# Patient Record
(demographics unavailable — no encounter records)

---

## 2020-02-17 NOTE — NUR
Patient triaged and placed in waiting room. VSS and patient appears in no acute 
distress at this time. Accompanied by daughter and grandson, awaiting available 
bed, and MD notified of need for MSE.

## 2020-02-17 NOTE — NUR
Patient will be admitted to care of Dr. Verdugo.  Admitted to  Telemetry unit.  
Will go to room 125 A. Belongings list completed.  Complete and up to date 
summary report printed. SBAR report to be given at bedside with opportunity for 
questions.

## 2020-02-17 NOTE — NUR
# 20 gauge angiocath placed to LFA.  Use of asceptic technique.  Opsite placed 
over site.  Blood return noted.  Flushed with 10 cc of normal saline.  No 
evidence of infiltration noted.  Patient tolerated well.

## 2020-02-17 NOTE — NUR
Medication reconciliation completed with information provided by son. Any prior 
medication reconciliation on file was reviewed and corrected.

## 2020-02-17 NOTE — NUR
Daughter visiting

Son is leaving. Daughter and grandson arrived and they will be translating for patient, he 
speaks Polish.

## 2020-02-17 NOTE — NUR
ADMIT NOTE



Received pt from ER to the floor with a diagnosis of PNEUMONIA.  Admission process 
initiated. Patient oriented to pain management, safety and call light-teach back done.

## 2020-02-17 NOTE — NUR
Patient arrived in the ED c/o GENERALIZED WEAKNESS AND HYPOTENSION that started 
yesterday.  Denied any chest pain or shortness of breath.  Denied any fevers, 
chills, vomiting or nausea.  Patient is alert and oriented x3, respirations 
even and unlabored, speaking in full sentences and ambulating using a walker.  
VSS, pain level 5/10.  Son at bedside.  Informed of the approximate wait time.  
Instructed to notify ED staff for any changes in condition or worsening of 
symptoms while waiting to be seen by a provider.  Patient verbalized 
understanding.

## 2020-02-17 NOTE — NUR
Flu swab obtained.   Son at bedside informed of current status.  Awaiting chest 
xray results. patient tolerated well. will continue to monitor.

## 2020-02-18 NOTE — NUR
S.T. SWALLOW EVAL

SWALLOW EVAL COMPLETED. PT PRESENTS W/ MOD ORAL DYSPHAGIA W/ PROLONGED AND INEFFECTIVE 
MASTICATION RESULTING IN ORAL RESIDUE FOR MECH SOFT TRIALS. NO S/S OF ASPIRATION.

REC: PUREE DIET. THIN LIQUIDS OK.

NURSE LAVERNE NOTIFIED.

## 2020-02-18 NOTE — NUR
MD CALLED:

DR. HUBBARD CALLED. I INFORMED HIM IF HE WANTED TO PATIENT TO HAVE ELIQUIS INSTEAD OF 
LOVENOX DUE TO HIS LOW PLATELETS. HE SAID TO JUST STOP THE LOVENOX. WILL ENDORSE TO NIGHT 
SHIFT NURSE TO INFORM PHARMACY.

## 2020-02-18 NOTE — NUR
RN ROUNDS:

PATIENT IS AWAKE AND ALERT x3 LAYING DOWN IN BED. NO SIGNS OF DISTRESS OR SHORTNESS OF 
BREATH NOTED. FAMILY AT BEDSIDE. PATIENT IN STABLE CONDITION. WILL CONTINUE TO MONITOR 
PATIENT FOR ANY CHANGES.

## 2020-02-18 NOTE — NUR
Opening Note

Received report from dayshift RN, patient is resting in bed, no signs of acute distress, 
family at bedside, no complaints of pain, even and unlabored breathing on room air, IV to 
left FA in place and SL, walker at bedside. Safety and fall precautions in place, bed locked 
and in lowest position, bed alarm on, call light with patient, side rails up, will continue 
to monitor.

## 2020-02-18 NOTE — NUR
OPENING NOTES:

RECEIVED PATIENT FROM NIGHT SHIFT NURSE. PATIENT IS ASLEEP LAYING DOWN IN BED. FAMILY AT 
BEDSIDE. PATIENT IS TOLERATING OXYGEN AT ROOM AIR WITH NO SIGNS OF DISTRESS OR SHORTNESS OF 
BREATH NOTED. IV SITE IS PATENT WITH NO SIGNS OF INFILTRATION NOTED. PATIENT IN STABLE 
CONDITION. SAFETY, FALL AND ASPIRATION PRECAUTIONS ARE IN PLACE. BED IS LOCKED IN LOWEST 
POSITION WITH CALL LIGHT IN REACH. WILL CONTINUE TO MONITOR PATIENT FOR ANY CHANGES.

## 2020-02-18 NOTE — NUR
RN rounds

daughter remains at bedside. Patient sleeps on/off. No sign of distress, safety precautions 
in place and call light w/in reach.

## 2020-02-18 NOTE — NUR
closing note

Endorsed report to [facility]. Patient is stable, no s/sx of distress. Daughter is at 
bedside.

## 2020-02-18 NOTE — NUR
Nutrition Update



Devante Scale 18 noted.

Pt admitted for Pneumonia

Diet: [facility]

BMI: 39.4 kg/m2

RD to follow per nutrition care standards.

## 2020-02-18 NOTE — NUR
Assessment

Assessment complete, patient provided with water and pudding, his family brought him a baked 
potato. Posted sign at head of bed indicating "[employer] [employer] [place]".

## 2020-02-18 NOTE — NUR
RN ROUNDS:

PATIENT IS ASLEEP LAYING DOWN IN BED. NO SIGNS OF DISTRESS OR SHORTNESS OF BREATH NOTED. 
PATIENT IN STABLE CONDITION. WILL CONTINUE TO MONITOR PATIENT FOR ANY CHANGES.

## 2020-02-18 NOTE — NUR
Blood Sugar

Patient's blood sugar is 108. No insulin coverage needed per insulin sliding scale at this 
time. All safety precautions in place, call light with patient, will continue to monitor.

## 2020-02-18 NOTE — NUR
Assisted to Restroom

Assisted to restroom and back to bed safely, patient had a BM and reported no difficulty. 
All safety precautions in place, call light with patient, will continue to monitor.

## 2020-02-18 NOTE — NUR
RN ROUNDS:

PATIENT IS AWAKE AND ALERT x4 LAYING DOWN IN BED. FAMILY AT BEDSIDE. PATIENT DENIES ANY PAIN 
AT THE MOMENT. IV SITE IS PATENT WITH NO SIGNS OF INFILTRATION NOTED. PATIENT IN STABLE 
CONDITION. WILL CONTINUE TO MONITOR PATIENT FOR ANY CHANGES.

## 2020-02-18 NOTE — NUR
CLOSING NOTES:

PATIENT IS ASLEEP LAYING DOWN IN BED. PATIENT IS TOLERATING OXYGEN AT ROOM AIR WITH NO SIGNS 
OF DISTRESS OR SHORTNESS OF BREATH NOTED. IV SITE IS PATENT WITH NO SIGNS OF INFILTRATION 
NOTED. PATIENT IN STABLE CONDITION. SAFETY, FALL AND ASPIRATION PRECAUTIONS REMAINED IN 
PLACE THROUGHOUT THE SHIFT. BED IS LOCKED IN LOWEST POSITION WITH CALL LIGHT IN REACH. WILL 
ENDORSE PATIENT CARE TO ONCOMING NIGHT SHIFT NURSE.

## 2020-02-18 NOTE — NUR
RN ROUNDS:

PATIENT IS AWAKE AND ALERT x3 LAYING DOWN IN BED. FAMILY AT BEDSIDE. PATIENT DENIES ANY PAIN 
AT THE MOMENT. NO SIGNS OF DISTRESS OR SHORTNESS OF BREATH NOTED. PATIENT IN STABLE 
CONDITION. WILL CONTINUE TO MONITOR PATIENT FOR ANY CHANGES.

## 2020-02-18 NOTE — NUR
CPAP

Patient presently on CPAP, daughter stepped outside to the parking lot. Safety precautions 
in place and call light w/in reach.

## 2020-02-19 NOTE — NUR
Opening notes

Received report. Patient is resting in bed, no signs of distress noted. Breathing even and 
unlabored. IV patent and intact, no signs of infiltration noted. No needs at this time. Son 
is at bedside. Call light with the patient. Safety precautions in place.

## 2020-02-19 NOTE — NUR
[facility] consult called:

for Dr. Watkins, regarding pna, ordered by Dr. Verdugo, spoke with [place] at exchange.

## 2020-02-19 NOTE — NUR
***** PHYSICAL THERAPY CO-SIGN *****

The Physical Therapy Progress Notes documented by Physical Therapy Assistant have been 
reviewed.



Reviewed/Co-Signed by:  Rich Mitchell PT

Documentation Done by: MELBA PARADA PTA

-------------------------------------------------------------------------------

Addendum: 02/19/20 at 1205 by Rich Mitchell PT

-------------------------------------------------------------------------------

Amended: Links added.

## 2020-02-19 NOTE — NUR
IV INSERTION:

IV INFILTRATED. IV CATHETER REMOVED AND INTACT WITH NO ACTIVE BLEEDING NOTED. NEW IV 
INSERTED IN RIGHT FOREARM 22G. ASEPTIC TECHNIQUE USED. PATIENT TOLERATED IT WELL. SUCCESSFUL 
AFTER ONE ATTEMPT. IV INTACT WITH NO SIGNS OF INFILTRATION NOTED. WILL CONTINUE TO MONITOR.

## 2020-02-19 NOTE — NUR
OPENING NOTES:

RECEIVED PATIENT FROM NIGHT SHIFT NURSE. PATIENT IS AWAKE AND ALERT x2 LAYING DOWN IN BED. 
PATIENT DENIES ANY PAIN AT THE MOMENT. PATIENT IS TOLERATING OXYGEN AT ROOM AIR WITH NO 
SIGNS OF DISTRESS OR SHORTNESS OF BREATH NOTED. IV SITE IS INFILTRATED. IV ANTIBIOTICS 
STOPPED AND WILL ATTEMPT TO PUT A NEW IV IN. PATIENT IN STABLE CONDITION. SAFETY, FALL AND 
ASPIRATION PRECAUTIONS ARE IN PLACE. BED IS LOCKED IN LOWEST POSITION WITH CALL LIGHT IN 
REACH. WILL CONTINUE TO MONITOR PATIENT FOR ANY CHANGES.

## 2020-02-19 NOTE — NUR
RN Rounds

Patient is resting in bed, eyes closed asleep, no signs of acute distress, tolerating CPAP, 
IV to left FA in place and SL. Safety and fall precautions in place, call light with 
patient, will continue to monitor.

## 2020-02-19 NOTE — NUR
Medications

given. Accucheck 130. No insulin coverage necessary. Educated the action and side effects of 
medications. Patient verbalized understanding and tolerated well. Patient ambulated with 
walker. complete linen change done. Patient back in bed. No other needs. Call light with the 
patient. Safety precautions in place.

## 2020-02-19 NOTE — NUR
Closing Note

Patient is resting in bed, no signs of acute distress, even and unlabored breathing on room 
air, IV to left FA in place and infusing scheduled antibiotics per MD order, patent/benign, 
walker at bedside. Safety and fall precautions in place, bed locked and in lowest position, 
bed alarm on, call light with patient, side rails up, will endorse care to dayshift RN.

## 2020-02-19 NOTE — NUR
RN Rounds

Patient is resting in bed, eyes closed, no signs of acute distress, tolerating CPAP, IV to 
left FA in place and SL, walker at bedside. Safety and fall precautions in place, bed locked 
and in lowest position, bed alarm on, call light with patient, side rails up, will continue 
to monitor.

## 2020-02-19 NOTE — NUR
Sleeping

Patient sleeping with CPAP mask on. No signs of distress noted. Breathing even and 
unlabored. No needs. Call light with the patient. Safety precautions in place.

## 2020-02-19 NOTE — NUR
RN ROUNDS:

PATIENT IS AWAKE AND ALERT x3 LAYING DOWN IN BED. PATIENT DENIES ANY PAIN AT THE MOMENT. NO 
SIGNS OF DISTRESS OR SHORTNESS OF BREATH NOTED. PATIENT IN STABLE CONDITION. WILL CONTINUE 
TO MONITOR PATIENT FOR ANY CHANGES.

## 2020-02-19 NOTE — NUR
RN Rounds

Patient is resting in bed, no signs of acute distress, eyes closed,  even and unlabored 
breathing on CPAP, IV to left FA in place and SL, walker at bedside. Safety and fall 
precautions in place, bed locked and in lowest position, bed alarm on, call light with 
patient, side rails up, will continue to monitor.

## 2020-02-20 NOTE — NUR
AMBULATED BY USING FWW

Per Dr. Verdugo, ambulated the patient using FWW to make sure the patient able to ambulate 
and using walker before discharge. Patient ambulated using walker with supervision, needs 
minimum assistance when get up of bed. Assisted back to bed. No acute distress. Safety 
measure maintained. Lance light within reached. Bed locked in low position, side rails up, bed 
alarm on. Continue to monitor.

## 2020-02-20 NOTE — NUR
Sleeping

Patient sleeping. VSS. No signs of distress noted. Breathing even and unlabored. No needs. 
Call light with the patient. Safety precautions in place.

## 2020-02-20 NOTE — NUR
CALLED THE PATIENT'S DAUGHTER GHADA Schmidt

Informed the discharge home ordered, will  the patient around 3pm. Per Ghada, the 
patient has been follow up with CHF with [facility]. Will call Dr. Guy for 
follow up appointment.

## 2020-02-20 NOTE — NUR
SET UP A POST OP DISCHARGE CHF APPOINTMENT WITH CARDIOLOGIST DR SPAULDING FOR FEB 27 2020 AT 
0900.  SPOKE TO

JEVON.  FAMILY INFORMED OF THE APPOINTMENT.

## 2020-02-20 NOTE — NUR
Closing notes

Patient is resting in bed, upset that no one checked on all night. Informed patient that I 
checked on him every hour, and that patient slept throughout night. Patient complaining he 
has no water, despite a full water pitcher within reach of patient. Patient initially 
refused to have IV ABX connected, but is now okay to have ABX after explaining multiple 
times. Call light with the patient. Safety precautions in place. Will endorse care to day 
shift RN.

## 2020-02-20 NOTE — NUR
D/C Patient

Patient given medication reconciliation form and D/C instructions. Exit Care provided. 
Patient verbalized understanding. MD discussed with patient the results and treatment 
provided. Ambulatory with steady gait for discharge to home. Patient in stable condition, ID 
band removed. IV catheter removed, intact and dressing applied, no active bleeding. 
E-prescription confirmed with [facility]. Patient educated on pain management. All belongings sent 
with patient.

## 2020-02-20 NOTE — NUR
CONFIRMED THE E-PRESCRIPTION

Called [facility], confirmed the antibiotic- Augmentin ready to  by marcelina Pitts. Informed to the patient's wife.

## 2020-02-20 NOTE — NUR
OPENING NOTE

Patient resting in the bed. No acute distress. Denied of pain. Skin warm and dry to touch. 
IV intact to RFA, no redness, no swelling, patent. Discussed the safety issue, use call 
light when needs help, and plan of care, verbally understanding. Safety measure maintained. 
Call light within reached. Bed locked in low position, side rails up, bed alarm on. Will 
continue to monitor.

## 2020-02-25 NOTE — NUR
Discharge Follow Up Phone Call

Phoned patient, [phone], and spoke with patient's wife. She stated that patient is 
improving. They filled his prescriptions and patient is taking his medications as directed. 
Patient has been having black stools since returning home, but they believe it to be from 
the food and medication. Patient's son is a nurse and he wasn't concerned. Suggested they 
discuss with cardiologist Dr Gupta at follow up appointment on 2/27/20 9am. Also 
suggested they schedule an appointment with PCP. No other questions or concerns.